# Patient Record
Sex: MALE | Race: WHITE
[De-identification: names, ages, dates, MRNs, and addresses within clinical notes are randomized per-mention and may not be internally consistent; named-entity substitution may affect disease eponyms.]

---

## 2017-07-18 ENCOUNTER — HOSPITAL ENCOUNTER (OUTPATIENT)
Dept: HOSPITAL 53 - M OPP | Age: 72
End: 2017-07-18
Attending: SURGERY
Payer: MEDICARE

## 2017-07-18 VITALS — HEIGHT: 72 IN | WEIGHT: 195 LBS | BODY MASS INDEX: 26.41 KG/M2

## 2017-07-18 VITALS — DIASTOLIC BLOOD PRESSURE: 62 MMHG | SYSTOLIC BLOOD PRESSURE: 137 MMHG

## 2017-07-18 DIAGNOSIS — Z12.11: Primary | ICD-10-CM

## 2017-07-18 DIAGNOSIS — C44.519: ICD-10-CM

## 2017-07-18 DIAGNOSIS — D12.6: ICD-10-CM

## 2017-07-18 DIAGNOSIS — Z79.899: ICD-10-CM

## 2017-07-18 DIAGNOSIS — Z86.010: ICD-10-CM

## 2017-07-18 DIAGNOSIS — R07.9: ICD-10-CM

## 2017-07-18 DIAGNOSIS — N40.0: ICD-10-CM

## 2017-07-18 DIAGNOSIS — K57.30: ICD-10-CM

## 2017-07-18 DIAGNOSIS — Z79.82: ICD-10-CM

## 2017-07-18 DIAGNOSIS — J45.909: ICD-10-CM

## 2017-07-18 NOTE — ROOR
________________________________________________________________________________

Patient Name: Jam Guidry             Procedure Date: 7/18/2017 12:02 PM

MRN: [id number]                          Account Number: [id number]

YOB: 1945              Age: 71

Room: [facility]                            Gender: Male

Note Status: Finalized                 

________________________________________________________________________________

 

Procedure:           Colonoscopy

Indications:         High risk colon cancer surveillance: Personal history of 

                     non-advanced adenoma, Last colonoscopy: November 2011

Providers:           John Cardona MD

Referring MD:        Juancarlos Trivedi MD

Requesting Provider: 

Medicines:           Monitored Anesthesia Care

Complications:       No immediate complications.

________________________________________________________________________________

Procedure:           Pre-Anesthesia Assessment:

                     - Prior to the procedure, a History and Physical was 

                     performed, and patient medications and allergies were 

                     reviewed. The patient is competent. The risks and 

                     benefits of the procedure and the sedation options and 

                     risks were discussed with the patient. All questions were 

                     answered and informed consent was obtained. Patient 

                     identification and proposed procedure were verified by 

                     the physician, the nurse and the anesthesiologist in the 

                     procedure room. Mental Status Examination: alert and 

                     oriented. Airway Examination: normal oropharyngeal airway 

                     and neck mobility. CV Examination: regular rate and 

                     rhythm. Prophylactic Antibiotics: The patient does not 

                     require prophylactic antibiotics. Prior Anticoagulants: 

                     The patient has taken no previous anticoagulant or 

                     antiplatelet agents. ASA Grade Assessment: II - A patient 

                     with mild systemic disease. After reviewing the risks and 

                     benefits, the patient was deemed in satisfactory 

                     condition to undergo the procedure. The anesthesia plan 

                     was to use monitored anesthesia care (MAC). Immediately 

                     prior to administration of medications, the patient was 

                     re-assessed for adequacy to receive sedatives. The heart 

                     rate, respiratory rate, oxygen saturations, blood 

                     pressure, adequacy of pulmonary ventilation, and response 

                     to care were monitored throughout the procedure. The 

                     physical status of the patient was re-assessed after the 

                     procedure.

                     The was introduced through the anus and advanced to the 

                     ileocecal valve. The colonoscopy was somewhat difficult 

                     due to poor bowel prep. Successful completion of the 

                     procedure was aided by lavage. The patient tolerated the 

                     procedure well.

                                                                                

Findings:

     The perianal and digital rectal examinations were normal.

     Multiple medium-mouthed diverticula were found in the sigmoid colon and 

     descending colon.

     A 3 mm polyp was found at 60 cm proximal to the anus. The polyp was 

     sessile. The polyp was removed with a jumbo cold forceps. Resection and 

     retrieval were complete. Estimated blood loss was minimal.

                                                                                

Impression:          - Diverticulosis in the sigmoid colon and in the 

                     descending colon.

                     - One 3 mm polyp at 60 cm proximal to the anus, removed 

                     with a jumbo cold forceps. Resected and retrieved.

Recommendation:      - Discharge patient to home.

                     - Resume regular diet.

                     - Continue present medications.

                     - Await pathology results.

                     - Telephone endoscopist for pathology results in 10 days.

                                                                                

 

__________________

John Cardona MD

7/18/2017 12:56:10 PM

Number of Addenda: 0

 

Note Initiated On: 7/18/2017 12:02 PM

Estimated Blood Loss:

     Estimated blood loss was minimal.

## 2017-08-14 ENCOUNTER — HOSPITAL ENCOUNTER (OUTPATIENT)
Dept: HOSPITAL 53 - M SFHCPLAZ | Age: 72
End: 2017-08-14
Attending: INTERNAL MEDICINE
Payer: MEDICARE

## 2017-08-14 DIAGNOSIS — F41.9: ICD-10-CM

## 2017-08-14 DIAGNOSIS — Z86.010: Primary | ICD-10-CM

## 2017-08-14 DIAGNOSIS — Z79.899: ICD-10-CM

## 2017-08-14 DIAGNOSIS — R97.20: ICD-10-CM

## 2017-08-14 DIAGNOSIS — E78.00: ICD-10-CM

## 2017-08-14 LAB
ALBUMIN SERPL BCG-MCNC: 3.9 GM/DL (ref 3.2–5.2)
ALBUMIN/GLOB SERPL: 1.3 {RATIO} (ref 1–1.93)
ALP SERPL-CCNC: 68 U/L (ref 45–117)
ALT SERPL W P-5'-P-CCNC: 14 U/L (ref 12–78)
ANION GAP SERPL CALC-SCNC: 7 MEQ/L (ref 8–16)
AST SERPL-CCNC: 15 U/L (ref 15–37)
BILIRUB SERPL-MCNC: 0.5 MG/DL (ref 0.2–1)
BUN SERPL-MCNC: 23 MG/DL (ref 7–18)
CALCIUM SERPL-MCNC: 8.4 MG/DL (ref 8.8–10.2)
CHLORIDE SERPL-SCNC: 107 MEQ/L (ref 98–107)
CHOLEST SERPL-MCNC: 210 MG/DL (ref ?–200)
CO2 SERPL-SCNC: 29 MEQ/L (ref 21–32)
CREAT SERPL-MCNC: 1.01 MG/DL (ref 0.7–1.3)
ERYTHROCYTE [DISTWIDTH] IN BLOOD BY AUTOMATED COUNT: 12.6 % (ref 11.5–14.5)
GFR SERPL CREATININE-BSD FRML MDRD: > 60 ML/MIN/{1.73_M2} (ref 42–?)
GLUCOSE SERPL-MCNC: 72 MG/DL (ref 83–110)
MCH RBC QN AUTO: 31.8 PG (ref 27–33)
MCHC RBC AUTO-ENTMCNC: 32.9 G/DL (ref 32–36.5)
MCV RBC AUTO: 96.4 FL (ref 80–96)
PLATELET # BLD AUTO: 223 K/MM3 (ref 150–450)
POTASSIUM SERPL-SCNC: 4.2 MEQ/L (ref 3.5–5.1)
PROT SERPL-MCNC: 6.9 GM/DL (ref 6.4–8.2)
SODIUM SERPL-SCNC: 143 MEQ/L (ref 136–145)
TRIGL SERPL-MCNC: 122 MG/DL (ref ?–150)
WBC # BLD AUTO: 4.6 K/MM3 (ref 4–10)

## 2018-08-02 ENCOUNTER — HOSPITAL ENCOUNTER (OUTPATIENT)
Dept: HOSPITAL 53 - M SFHCPLAZ | Age: 73
End: 2018-08-02
Attending: INTERNAL MEDICINE
Payer: MEDICARE

## 2018-08-02 DIAGNOSIS — E78.00: ICD-10-CM

## 2018-08-02 DIAGNOSIS — Z86.010: Primary | ICD-10-CM

## 2018-08-02 LAB
ALBUMIN/GLOBULIN RATIO: 1.16 (ref 1–1.93)
ALBUMIN: 3.7 GM/DL (ref 3.2–5.2)
ALKALINE PHOSPHATASE: 72 U/L (ref 45–117)
ALT SERPL W P-5'-P-CCNC: 16 U/L (ref 12–78)
ANION GAP: 6 MEQ/L (ref 8–16)
AST SERPL-CCNC: 14 U/L (ref 7–37)
BILIRUBIN,TOTAL: 0.5 MG/DL (ref 0.2–1)
BLOOD UREA NITROGEN: 19 MG/DL (ref 7–18)
CALCIUM LEVEL: 8.6 MG/DL (ref 8.8–10.2)
CARBON DIOXIDE LEVEL: 31 MEQ/L (ref 21–32)
CHLORIDE LEVEL: 106 MEQ/L (ref 98–107)
CHOLEST SERPL-MCNC: 211 MG/DL (ref ?–200)
CHOLESTEROL RISK RATIO: 4.22 (ref ?–5)
CREATININE FOR GFR: 1.01 MG/DL (ref 0.7–1.3)
GFR SERPL CREATININE-BSD FRML MDRD: > 60 ML/MIN/{1.73_M2} (ref 42–?)
GLUCOSE, FASTING: 76 MG/DL (ref 70–100)
HDLC SERPL-MCNC: 50 MG/DL (ref 40–?)
HEMATOCRIT: 43.4 % (ref 42–52)
HEMOGLOBIN: 14.5 G/DL (ref 13.5–17.5)
LDL CHOLESTEROL: 136.8 MG/DL (ref ?–100)
MEAN CORPUSCULAR HEMOGLOBIN: 30.8 PG (ref 27–33)
MEAN CORPUSCULAR HGB CONC: 33.4 G/DL (ref 32–36.5)
MEAN CORPUSCULAR VOLUME: 92.1 FL (ref 80–96)
NONHDLC SERPL-MCNC: 161 MG/DL
NRBC BLD AUTO-RTO: 0 % (ref 0–0)
PLATELET COUNT, AUTOMATED: 218 10^3/UL (ref 150–450)
POTASSIUM SERUM: 4.5 MEQ/L (ref 3.5–5.1)
RED BLOOD COUNT: 4.71 10^6/UL (ref 4.3–6.1)
RED CELL DISTRIBUTION WIDTH: 12.2 % (ref 11.5–14.5)
SODIUM LEVEL: 143 MEQ/L (ref 136–145)
TOTAL PROTEIN: 6.9 GM/DL (ref 6.4–8.2)
TRIGLYCERIDES LEVEL: 121 MG/DL (ref ?–150)
WHITE BLOOD COUNT: 4.6 10^3/UL (ref 4–10)

## 2018-08-02 PROCEDURE — 80053 COMPREHEN METABOLIC PANEL: CPT

## 2018-10-03 ENCOUNTER — HOSPITAL ENCOUNTER (OUTPATIENT)
Dept: HOSPITAL 53 - M SMT | Age: 73
End: 2018-10-03
Attending: NURSE PRACTITIONER
Payer: MEDICARE

## 2018-10-03 DIAGNOSIS — N40.1: Primary | ICD-10-CM

## 2018-10-03 LAB — PSA SERPL-MCNC: 2.03 NG/ML (ref ?–4)

## 2018-10-03 PROCEDURE — 84153 ASSAY OF PSA TOTAL: CPT

## 2018-10-05 ENCOUNTER — HOSPITAL ENCOUNTER (OUTPATIENT)
Dept: HOSPITAL 53 - M RAD | Age: 73
End: 2018-10-05
Attending: NURSE PRACTITIONER
Payer: MEDICARE

## 2018-10-05 DIAGNOSIS — R39.198: ICD-10-CM

## 2018-10-05 DIAGNOSIS — N40.1: Primary | ICD-10-CM

## 2018-10-05 PROCEDURE — 76857 US EXAM PELVIC LIMITED: CPT

## 2019-08-08 ENCOUNTER — HOSPITAL ENCOUNTER (OUTPATIENT)
Dept: HOSPITAL 53 - M SFHCPLAZ | Age: 74
End: 2019-08-08
Attending: INTERNAL MEDICINE
Payer: MEDICARE

## 2019-08-08 DIAGNOSIS — E78.00: Primary | ICD-10-CM

## 2019-08-08 LAB
ALBUMIN SERPL BCG-MCNC: 3.6 GM/DL (ref 3.2–5.2)
ALT SERPL W P-5'-P-CCNC: 14 U/L (ref 12–78)
BILIRUB SERPL-MCNC: 0.6 MG/DL (ref 0.2–1)
BUN SERPL-MCNC: 16 MG/DL (ref 7–18)
CALCIUM SERPL-MCNC: 8.8 MG/DL (ref 8.8–10.2)
CHLORIDE SERPL-SCNC: 107 MEQ/L (ref 98–107)
CHOLEST SERPL-MCNC: 215 MG/DL (ref ?–200)
CHOLEST/HDLC SERPL: 4.67 {RATIO} (ref ?–5)
CO2 SERPL-SCNC: 33 MEQ/L (ref 21–32)
CREAT SERPL-MCNC: 1.05 MG/DL (ref 0.7–1.3)
GFR SERPL CREATININE-BSD FRML MDRD: > 60 ML/MIN/{1.73_M2} (ref 42–?)
GLUCOSE SERPL-MCNC: 78 MG/DL (ref 70–100)
HDLC SERPL-MCNC: 46 MG/DL (ref 40–?)
LDLC SERPL CALC-MCNC: 134 MG/DL (ref ?–100)
NONHDLC SERPL-MCNC: 169 MG/DL
POTASSIUM SERPL-SCNC: 4.4 MEQ/L (ref 3.5–5.1)
PROT SERPL-MCNC: 7 GM/DL (ref 6.4–8.2)
SODIUM SERPL-SCNC: 142 MEQ/L (ref 136–145)
TRIGL SERPL-MCNC: 175 MG/DL (ref ?–150)

## 2020-05-22 ENCOUNTER — HOSPITAL ENCOUNTER (OUTPATIENT)
Dept: HOSPITAL 53 - M PLAIMG | Age: 75
End: 2020-05-22
Attending: NURSE PRACTITIONER
Payer: MEDICARE

## 2020-05-22 DIAGNOSIS — M51.37: ICD-10-CM

## 2020-05-22 DIAGNOSIS — M54.5: ICD-10-CM

## 2020-05-22 DIAGNOSIS — M51.36: Primary | ICD-10-CM

## 2020-05-22 PROCEDURE — 72110 X-RAY EXAM L-2 SPINE 4/>VWS: CPT

## 2020-05-22 NOTE — REPPI
LUMBAR SPINE SERIES:  Five views.

 

History:  Low back pain.

 

FINDINGS:  Lumbar vertebral body heights are preserved.  Alignment is normal.

There is discogenic spurring and disc space narrowing L4-5.  Mild anterior

spurring is seen at L3-4.  There is no evidence of spondylolysis or

spondylolisthesis.  There is mild facet hypertrophy and sclerosis bilaterally at

L5-S1.  Sacrum and SI joints are intact.  Psoas margins are symmetric.  No bony

destructive lesion is seen.

 

IMPRESSION:

 

Degenerative spondylosis changes most pronounced at L4-5 and L5-S1.  No acute

abnormality.

 

 

Electronically Signed by

Syd Dee MD 05/22/2020 04:14 P

## 2020-06-17 ENCOUNTER — HOSPITAL ENCOUNTER (OUTPATIENT)
Dept: HOSPITAL 53 - M LAB REF | Age: 75
End: 2020-06-17
Attending: DERMATOLOGY
Payer: MEDICARE

## 2020-06-17 DIAGNOSIS — C44.310: ICD-10-CM

## 2020-06-17 DIAGNOSIS — C44.41: Primary | ICD-10-CM

## 2020-06-24 ENCOUNTER — HOSPITAL ENCOUNTER (OUTPATIENT)
Dept: HOSPITAL 53 - M LAB REF | Age: 75
End: 2020-06-24
Attending: DERMATOLOGY
Payer: MEDICARE

## 2020-06-24 DIAGNOSIS — C44.310: Primary | ICD-10-CM

## 2020-07-10 ENCOUNTER — HOSPITAL ENCOUNTER (OUTPATIENT)
Dept: HOSPITAL 53 - M LAB REF | Age: 75
End: 2020-07-10
Attending: DERMATOLOGY
Payer: MEDICARE

## 2020-07-10 DIAGNOSIS — L90.5: Primary | ICD-10-CM

## 2020-07-10 DIAGNOSIS — L57.8: ICD-10-CM

## 2020-08-14 ENCOUNTER — HOSPITAL ENCOUNTER (OUTPATIENT)
Dept: HOSPITAL 53 - M SFHCPLAZ | Age: 75
End: 2020-08-14
Attending: INTERNAL MEDICINE
Payer: MEDICARE

## 2020-08-14 DIAGNOSIS — N13.9: ICD-10-CM

## 2020-08-14 DIAGNOSIS — F41.9: ICD-10-CM

## 2020-08-14 DIAGNOSIS — Z00.00: Primary | ICD-10-CM

## 2020-08-14 DIAGNOSIS — E78.00: ICD-10-CM

## 2020-08-14 DIAGNOSIS — K21.9: ICD-10-CM

## 2020-10-01 LAB
BASOPHILS # BLD AUTO: 0 10^3/UL (ref 0–0.2)
BASOPHILS NFR BLD AUTO: 0.7 % (ref 0–1)
EOSINOPHIL # BLD AUTO: 0.1 10^3/UL (ref 0–0.5)
EOSINOPHIL NFR BLD AUTO: 3.1 % (ref 0–3)
HCT VFR BLD AUTO: 45.9 % (ref 42–52)
HGB BLD-MCNC: 15.1 G/DL (ref 13.5–17.5)
LYMPHOCYTES # BLD AUTO: 1.2 10^3/UL (ref 1.5–5)
LYMPHOCYTES NFR BLD AUTO: 28.6 % (ref 24–44)
MCH RBC QN AUTO: 30.9 PG (ref 27–33)
MCHC RBC AUTO-ENTMCNC: 32.9 G/DL (ref 32–36.5)
MCV RBC AUTO: 93.9 FL (ref 80–96)
MONOCYTES # BLD AUTO: 0.5 10^3/UL (ref 0–0.8)
MONOCYTES NFR BLD AUTO: 11.6 % (ref 0–5)
NEUTROPHILS # BLD AUTO: 2.3 10^3/UL (ref 1.5–8.5)
NEUTROPHILS NFR BLD AUTO: 55.8 % (ref 36–66)
PLATELET # BLD AUTO: 235 10^3/UL (ref 150–450)
RBC # BLD AUTO: 4.89 10^6/UL (ref 4.3–6.1)
WBC # BLD AUTO: 4.1 10^3/UL (ref 4–10)

## 2020-10-07 LAB
ALBUMIN SERPL BCG-MCNC: 3.8 GM/DL (ref 3.2–5.2)
ALT SERPL W P-5'-P-CCNC: 15 U/L (ref 12–78)
BILIRUB SERPL-MCNC: 0.5 MG/DL (ref 0.2–1)
BUN SERPL-MCNC: 21 MG/DL (ref 7–18)
CALCIUM SERPL-MCNC: 9.2 MG/DL (ref 8.8–10.2)
CHLORIDE SERPL-SCNC: 106 MEQ/L (ref 98–107)
CHOLEST SERPL-MCNC: 219 MG/DL (ref ?–200)
CHOLEST/HDLC SERPL: 4.87 {RATIO} (ref ?–5)
CO2 SERPL-SCNC: 34 MEQ/L (ref 21–32)
CREAT SERPL-MCNC: 1.09 MG/DL (ref 0.7–1.3)
GFR SERPL CREATININE-BSD FRML MDRD: > 60 ML/MIN/{1.73_M2} (ref 42–?)
GLUCOSE SERPL-MCNC: 84 MG/DL (ref 70–100)
HDLC SERPL-MCNC: 45 MG/DL (ref 40–?)
LDLC SERPL CALC-MCNC: 142 MG/DL (ref ?–100)
NONHDLC SERPL-MCNC: 174 MG/DL
POTASSIUM SERPL-SCNC: 4.3 MEQ/L (ref 3.5–5.1)
PROT SERPL-MCNC: 7.2 GM/DL (ref 6.4–8.2)
SODIUM SERPL-SCNC: 140 MEQ/L (ref 136–145)
TRIGL SERPL-MCNC: 161 MG/DL (ref ?–150)

## 2020-11-17 ENCOUNTER — HOSPITAL ENCOUNTER (OUTPATIENT)
Dept: HOSPITAL 53 - M LABSMTC | Age: 75
End: 2020-11-17
Attending: PEDIATRICS
Payer: SELF-PAY

## 2020-11-17 DIAGNOSIS — Z20.828: Primary | ICD-10-CM

## 2021-01-05 ENCOUNTER — HOSPITAL ENCOUNTER (OUTPATIENT)
Dept: HOSPITAL 53 - M LABSMTC | Age: 76
End: 2021-01-05
Attending: PEDIATRICS
Payer: SELF-PAY

## 2021-01-05 DIAGNOSIS — Z20.822: Primary | ICD-10-CM

## 2021-01-06 ENCOUNTER — HOSPITAL ENCOUNTER (OUTPATIENT)
Dept: HOSPITAL 53 - M SFHCPLAZ | Age: 76
End: 2021-01-06
Attending: INTERNAL MEDICINE
Payer: MEDICARE

## 2021-01-06 DIAGNOSIS — N13.9: ICD-10-CM

## 2021-01-06 DIAGNOSIS — Z01.818: Primary | ICD-10-CM

## 2021-01-06 LAB
BASOPHILS # BLD AUTO: 0 10^3/UL (ref 0–0.2)
BASOPHILS NFR BLD AUTO: 0.7 % (ref 0–1)
BUN SERPL-MCNC: 21 MG/DL (ref 7–18)
CALCIUM SERPL-MCNC: 9.2 MG/DL (ref 8.8–10.2)
CHLORIDE SERPL-SCNC: 108 MEQ/L (ref 98–107)
CO2 SERPL-SCNC: 30 MEQ/L (ref 21–32)
CREAT SERPL-MCNC: 1.11 MG/DL (ref 0.7–1.3)
EOSINOPHIL # BLD AUTO: 0.2 10^3/UL (ref 0–0.5)
EOSINOPHIL NFR BLD AUTO: 2.9 % (ref 0–3)
GFR SERPL CREATININE-BSD FRML MDRD: > 60 ML/MIN/{1.73_M2} (ref 42–?)
GLUCOSE SERPL-MCNC: 93 MG/DL (ref 70–100)
HCT VFR BLD AUTO: 47.6 % (ref 42–52)
HGB BLD-MCNC: 15 G/DL (ref 13.5–17.5)
LYMPHOCYTES # BLD AUTO: 1.2 10^3/UL (ref 1.5–5)
LYMPHOCYTES NFR BLD AUTO: 21 % (ref 24–44)
MCH RBC QN AUTO: 30.1 PG (ref 27–33)
MCHC RBC AUTO-ENTMCNC: 31.5 G/DL (ref 32–36.5)
MCV RBC AUTO: 95.6 FL (ref 80–96)
MONOCYTES # BLD AUTO: 0.6 10^3/UL (ref 0–0.8)
MONOCYTES NFR BLD AUTO: 10.8 % (ref 0–5)
NEUTROPHILS # BLD AUTO: 3.6 10^3/UL (ref 1.5–8.5)
NEUTROPHILS NFR BLD AUTO: 64.4 % (ref 36–66)
PLATELET # BLD AUTO: 218 10^3/UL (ref 150–450)
POTASSIUM SERPL-SCNC: 4.4 MEQ/L (ref 3.5–5.1)
RBC # BLD AUTO: 4.98 10^6/UL (ref 4.3–6.1)
SODIUM SERPL-SCNC: 141 MEQ/L (ref 136–145)
WBC # BLD AUTO: 5.5 10^3/UL (ref 4–10)

## 2021-01-06 PROCEDURE — 87086 URINE CULTURE/COLONY COUNT: CPT

## 2021-01-06 PROCEDURE — 85025 COMPLETE CBC W/AUTO DIFF WBC: CPT

## 2021-01-06 PROCEDURE — 93005 ELECTROCARDIOGRAM TRACING: CPT

## 2021-01-06 PROCEDURE — 36415 COLL VENOUS BLD VENIPUNCTURE: CPT

## 2021-01-06 PROCEDURE — 80048 BASIC METABOLIC PNL TOTAL CA: CPT

## 2021-04-21 ENCOUNTER — HOSPITAL ENCOUNTER (EMERGENCY)
Dept: HOSPITAL 53 - M ED | Age: 76
Discharge: ANOTHER HOSPITAL | End: 2021-04-21
Payer: MEDICARE

## 2021-04-21 VITALS — HEIGHT: 71 IN | WEIGHT: 200.84 LBS | BODY MASS INDEX: 28.12 KG/M2

## 2021-04-21 VITALS — DIASTOLIC BLOOD PRESSURE: 78 MMHG | SYSTOLIC BLOOD PRESSURE: 141 MMHG

## 2021-04-21 DIAGNOSIS — Z79.82: ICD-10-CM

## 2021-04-21 DIAGNOSIS — Z79.899: ICD-10-CM

## 2021-04-21 DIAGNOSIS — Z87.891: ICD-10-CM

## 2021-04-21 DIAGNOSIS — I24.9: Primary | ICD-10-CM

## 2021-04-21 DIAGNOSIS — K21.9: ICD-10-CM

## 2021-04-21 DIAGNOSIS — E78.5: ICD-10-CM

## 2021-04-21 LAB
ALBUMIN SERPL BCG-MCNC: 3.5 GM/DL (ref 3.2–5.2)
ALT SERPL W P-5'-P-CCNC: 21 U/L (ref 12–78)
BASOPHILS # BLD AUTO: 0 10^3/UL (ref 0–0.2)
BASOPHILS NFR BLD AUTO: 0.7 % (ref 0–1)
BILIRUB CONJ SERPL-MCNC: < 0.1 MG/DL (ref 0–0.2)
BILIRUB SERPL-MCNC: 0.5 MG/DL (ref 0.2–1)
BUN SERPL-MCNC: 18 MG/DL (ref 7–18)
CALCIUM SERPL-MCNC: 9 MG/DL (ref 8.8–10.2)
CHLORIDE SERPL-SCNC: 106 MEQ/L (ref 98–107)
CO2 SERPL-SCNC: 30 MEQ/L (ref 21–32)
CREAT SERPL-MCNC: 0.93 MG/DL (ref 0.7–1.3)
EOSINOPHIL # BLD AUTO: 0.1 10^3/UL (ref 0–0.5)
EOSINOPHIL NFR BLD AUTO: 2.2 % (ref 0–3)
GFR SERPL CREATININE-BSD FRML MDRD: > 60 ML/MIN/{1.73_M2} (ref 42–?)
GLUCOSE SERPL-MCNC: 103 MG/DL (ref 70–100)
HCT VFR BLD AUTO: 46.7 % (ref 42–52)
HGB BLD-MCNC: 15.2 G/DL (ref 13.5–17.5)
LIPASE SERPL-CCNC: 75 U/L (ref 73–393)
LYMPHOCYTES # BLD AUTO: 1.1 10^3/UL (ref 1.5–5)
LYMPHOCYTES NFR BLD AUTO: 20.2 % (ref 24–44)
MCH RBC QN AUTO: 29.9 PG (ref 27–33)
MCHC RBC AUTO-ENTMCNC: 32.5 G/DL (ref 32–36.5)
MCV RBC AUTO: 91.9 FL (ref 80–96)
MONOCYTES # BLD AUTO: 0.5 10^3/UL (ref 0–0.8)
MONOCYTES NFR BLD AUTO: 9 % (ref 2–8)
NEUTROPHILS # BLD AUTO: 3.8 10^3/UL (ref 1.5–8.5)
NEUTROPHILS NFR BLD AUTO: 67.7 % (ref 36–66)
NT-PRO BNP: 97 PG/ML (ref ?–125)
PLATELET # BLD AUTO: 218 10^3/UL (ref 150–450)
POTASSIUM SERPL-SCNC: 5.1 MEQ/L (ref 3.5–5.1)
PROT SERPL-MCNC: 7.2 GM/DL (ref 6.4–8.2)
RBC # BLD AUTO: 5.08 10^6/UL (ref 4.3–6.1)
RSV RNA NPH QL NAA+PROBE: NEGATIVE
SODIUM SERPL-SCNC: 139 MEQ/L (ref 136–145)
WBC # BLD AUTO: 5.6 10^3/UL (ref 4–10)

## 2021-04-21 PROCEDURE — 80048 BASIC METABOLIC PNL TOTAL CA: CPT

## 2021-04-21 PROCEDURE — 94760 N-INVAS EAR/PLS OXIMETRY 1: CPT

## 2021-04-21 PROCEDURE — 93005 ELECTROCARDIOGRAM TRACING: CPT

## 2021-04-21 PROCEDURE — 99285 EMERGENCY DEPT VISIT HI MDM: CPT

## 2021-04-21 PROCEDURE — 93041 RHYTHM ECG TRACING: CPT

## 2021-04-21 PROCEDURE — 96374 THER/PROPH/DIAG INJ IV PUSH: CPT

## 2021-04-21 PROCEDURE — 71045 X-RAY EXAM CHEST 1 VIEW: CPT

## 2021-04-21 PROCEDURE — 87631 RESP VIRUS 3-5 TARGETS: CPT

## 2021-04-21 PROCEDURE — 83690 ASSAY OF LIPASE: CPT

## 2021-04-21 PROCEDURE — 84484 ASSAY OF TROPONIN QUANT: CPT

## 2021-04-21 PROCEDURE — 83880 ASSAY OF NATRIURETIC PEPTIDE: CPT

## 2021-04-21 PROCEDURE — 85025 COMPLETE CBC W/AUTO DIFF WBC: CPT

## 2021-04-21 PROCEDURE — 80076 HEPATIC FUNCTION PANEL: CPT

## 2021-04-21 PROCEDURE — 36415 COLL VENOUS BLD VENIPUNCTURE: CPT

## 2021-04-21 NOTE — REP
INDICATION:

CHEST PAIN.



COMPARISON:

Comparison chest x-ray 30 November 2011.



TECHNIQUE:

Portable upright AP chest radiograph.



FINDINGS:

The lungs are well inflated and free of infiltrate.  Pleural angles are sharp.  Heart

size is normal. Pulmonary vasculature is not increased.  Monitoring electrodes are

seen.



IMPRESSION:

No active disease.





<Electronically signed by Erik Dee > 04/21/21 1841

## 2021-04-22 NOTE — ECGEPIP
[facility] - ED

                                       

                                       Test Date:    2021

Pat Name:     JOSE SANABRIA             Department:   

Patient ID:   [id number]                 Room:         -

Gender:       Male                     Technician:   laura

:          1945               Requested By: Eleni Pope 

Order Number: [id number]     Reading MD:   Eleni Pope

                                 Measurements

Intervals                              Axis          

Rate:         64                       P:            41

NV:           180                      QRS:          1

QRSD:         84                       T:            29

QT:           392                                    

QTc:          404                                    

                           Interpretive Statements

Normal sinus rhythm

No prior

Electronically Signed on 2021 12:41:10 EDT by Eleni Pope

## 2021-05-17 ENCOUNTER — HOSPITAL ENCOUNTER (OUTPATIENT)
Dept: HOSPITAL 53 - M RAD | Age: 76
End: 2021-05-17
Attending: INTERNAL MEDICINE
Payer: MEDICARE

## 2021-05-17 DIAGNOSIS — R07.9: Primary | ICD-10-CM

## 2021-05-17 NOTE — REP
INDICATION:

HISTORY OF CHEST PAIN.



FINDINGS:

Multiple ultrasonographic images of the liver show the hepatic parenchymal echo

texture to appear unremarkable.  There are no focal masses.  There are 2 incidental

hepatic cysts.  The largest measures 2.8 cm.  There is no intrahepatic ductal

dilatation.  The common bile duct measures between 3.3 mm in its greatest transverse

dimension. Multiple ultrasonographic images of the gallbladder show multiple echogenic

foci within the gallbladder lumen which casts acoustic shadows.  There is no

gallbladder wall thickening or pericholecystic edema. Images of the pancreatic region

show no gross abnormality.



The imaged portion of the right kidney is unremarkable.



IMPRESSION:

Cholelithiasis and incidental hepatic cysts.



Accredited by the [employer] in General Ultrasound.





<Electronically signed by Norris Dietrich > 05/17/21 [id number]

## 2021-06-22 ENCOUNTER — HOSPITAL ENCOUNTER (EMERGENCY)
Dept: HOSPITAL 53 - M ED | Age: 76
Discharge: HOME | End: 2021-06-22
Payer: MEDICARE

## 2021-06-22 VITALS — WEIGHT: 182.39 LBS | BODY MASS INDEX: 24.7 KG/M2 | HEIGHT: 72 IN

## 2021-06-22 VITALS — SYSTOLIC BLOOD PRESSURE: 138 MMHG | DIASTOLIC BLOOD PRESSURE: 60 MMHG

## 2021-06-22 DIAGNOSIS — I25.10: ICD-10-CM

## 2021-06-22 DIAGNOSIS — F41.9: ICD-10-CM

## 2021-06-22 DIAGNOSIS — E78.5: ICD-10-CM

## 2021-06-22 DIAGNOSIS — S46.812A: Primary | ICD-10-CM
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
## 2021-06-22 DIAGNOSIS — Z95.1: ICD-10-CM

## 2021-06-22 DIAGNOSIS — R94.31: ICD-10-CM

## 2021-06-22 DIAGNOSIS — J90: ICD-10-CM
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
## 2021-06-22 DIAGNOSIS — K21.9: ICD-10-CM
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
## 2021-06-22 DIAGNOSIS — Z79.899: ICD-10-CM
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
## 2021-06-22 DIAGNOSIS — Z79.82: ICD-10-CM

## 2021-06-22 DIAGNOSIS — Z87.891: ICD-10-CM

## 2021-06-22 LAB
BASOPHILS # BLD AUTO: 0.1 10^3/UL (ref 0–0.2)
BASOPHILS NFR BLD AUTO: 0.5 % (ref 0–1)
BUN SERPL-MCNC: 18 MG/DL (ref 7–18)
CALCIUM SERPL-MCNC: 9.2 MG/DL (ref 8.8–10.2)
CHLORIDE SERPL-SCNC: 103 MEQ/L (ref 98–107)
CK MB CFR.DF SERPL CALC: 4.04
CK MB SERPL-MCNC: 1.9 NG/ML (ref ?–3.6)
CK SERPL-CCNC: 47 U/L (ref 39–308)
CO2 SERPL-SCNC: 27 MEQ/L (ref 21–32)
CREAT SERPL-MCNC: 1.03 MG/DL (ref 0.7–1.3)
EOSINOPHIL # BLD AUTO: 0.5 10^3/UL (ref 0–0.5)
EOSINOPHIL NFR BLD AUTO: 3.9 % (ref 0–3)
GFR SERPL CREATININE-BSD FRML MDRD: > 60 ML/MIN/{1.73_M2} (ref 42–?)
GLUCOSE SERPL-MCNC: 146 MG/DL (ref 70–100)
HCT VFR BLD AUTO: 40.1 % (ref 42–52)
HGB BLD-MCNC: 12.8 G/DL (ref 13.5–17.5)
LYMPHOCYTES # BLD AUTO: 1.2 10^3/UL (ref 1.5–5)
LYMPHOCYTES NFR BLD AUTO: 9.1 % (ref 24–44)
MCH RBC QN AUTO: 29.8 PG (ref 27–33)
MCHC RBC AUTO-ENTMCNC: 31.9 G/DL (ref 32–36.5)
MCV RBC AUTO: 93.3 FL (ref 80–96)
MONOCYTES # BLD AUTO: 0.9 10^3/UL (ref 0–0.8)
MONOCYTES NFR BLD AUTO: 7.2 % (ref 2–8)
NEUTROPHILS # BLD AUTO: 10.2 10^3/UL (ref 1.5–8.5)
NEUTROPHILS NFR BLD AUTO: 78.3 % (ref 36–66)
PLATELET # BLD AUTO: 769 10^3/UL (ref 150–450)
POTASSIUM SERPL-SCNC: 4.6 MEQ/L (ref 3.5–5.1)
RBC # BLD AUTO: 4.3 10^6/UL (ref 4.3–6.1)
SODIUM SERPL-SCNC: 137 MEQ/L (ref 136–145)
TROPONIN I SERPL-MCNC: 0.13 NG/ML (ref ?–0.1)
WBC # BLD AUTO: 13.1 10^3/UL (ref 4–10)

## 2021-06-22 NOTE — REP
INDICATION:

CHEST PAIN.



COMPARISON:

04/21/2021.



TECHNIQUE:

Single portable AP view of the chest was performed.



FINDINGS:

There is an acute moderate left pleural effusion.  There is mild left base

atelectasis/infiltrate.  There are mild atelectatic changes in the right lung base.

Multiple sternal wires are present.  Heart size is not well evaluated due to

obscuration of the left heart border.



IMPRESSION:

Moderate left pleural effusion.  Left base atelectasis/infiltrate.  Mild atelectatic

changes right lung base.





<Electronically signed by Cory Huyhn > 06/22/21 1948

## 2021-06-23 ENCOUNTER — HOSPITAL ENCOUNTER (OUTPATIENT)
Dept: HOSPITAL 53 - M RAD | Age: 76
End: 2021-06-23
Attending: THORACIC SURGERY (CARDIOTHORACIC VASCULAR SURGERY)
Payer: MEDICARE

## 2021-06-23 DIAGNOSIS — R06.02: Primary | ICD-10-CM

## 2021-06-23 PROCEDURE — 71275 CT ANGIOGRAPHY CHEST: CPT

## 2021-06-23 NOTE — REP
INDICATION:

SOB RO PE



COMPARISON:

None.



TECHNIQUE:

CT angiography of the chest after the intravenous administration of 75 cc Isovue 370.

Attention pulmonary arteries.



FINDINGS:

There is good visualization of the pulmonary arterial vasculature, however, there is

motion artifact obscuring some of the detail..  No definite focal filling defects are

seen that would be considered consistent with acute pulmonary emboli.  The thoracic

aorta is within normal limits.  There is a small left pleural effusion.  There is a

slight pericardial effusion.  The imaged upper abdomen is within normal limits.  The

imaged osseous structures are within normal limits.



Evaluation of the lung fields shows significant respiratory motion artifact obscuring

the detail.  Curvilinear bibasilar opacities are noted.  There is evidence of

cylindrical bronchiectasis and biapical pleuroparenchymal scarring.



IMPRESSION:

1. There are exam limitations potentially obscuring small pulmonary emboli.

2. There is a small left pleural effusion and a slight pericardial effusion.

3. Lung field findings and limitations as described above.

4. There is a small liver cyst.

5. Other findings as described above.





<Electronically signed by Norris Dietrich > 06/23/21 1910

## 2021-06-24 NOTE — ECGEPIP
[facility] - ED

                                       

                                       Test Date:    2021

Pat Name:     JOSE SANABRIA             Department:   

Patient ID:   [id number]                 Room:         -

Gender:       Male                     Technician:   KYMBERLY

:          1945               Requested By: Bismark HAWKINS

Order Number: [id number]     Reading MD:   Bismark Ireland

                                 Measurements

Intervals                              Axis          

Rate:         106                      P:            28

WI:           158                      QRS:          10

QRSD:         94                       T:            28

QT:           320                                    

QTc:          425                                    

                           Interpretive Statements

Sinus tachycardia

Nonspecific ST and T wave abnormality, consider anterior ischemia

SIMILAR TO PRIOR ON SAME DATE

Electronically Signed on 2021 7:09:14 EDT by Bismark Ireland

## 2021-06-24 NOTE — ECGEPIP
[facility] - ED

                                       

                                       Test Date:    2021

Pat Name:     JOSE SANABRIA             Department:   

Patient ID:   [id number]                 Room:         -

Gender:       Male                     Technician:   KYMBERLY

:          1945               Requested By: Bismark HAWKINS

Order Number: [id number]     Reading MD:   Bismark Ireland

                                 Measurements

Intervals                              Axis          

Rate:         99                       P:            32

NH:           168                      QRS:          12

QRSD:         92                       T:            29

QT:           334                                    

QTc:          428                                    

                           Interpretive Statements

Normal sinus rhythm

Possible Left atrial enlargement

NSTTW ABNORMALITY(S) consider anterior ischemia

Electronically Signed on 2021 7:07:47 EDT by Bismark Ireland

## 2021-08-04 ENCOUNTER — HOSPITAL ENCOUNTER (OUTPATIENT)
Dept: HOSPITAL 53 - M PLALAB | Age: 76
End: 2021-08-04
Attending: INTERNAL MEDICINE
Payer: MEDICARE

## 2021-08-04 ENCOUNTER — HOSPITAL ENCOUNTER (OUTPATIENT)
Dept: HOSPITAL 53 - M SFHCPLAZ | Age: 76
End: 2021-08-04
Attending: INTERNAL MEDICINE
Payer: MEDICARE

## 2021-08-04 DIAGNOSIS — E78.00: Primary | ICD-10-CM

## 2021-08-04 DIAGNOSIS — Z86.010: ICD-10-CM

## 2021-08-04 DIAGNOSIS — Z11.59: ICD-10-CM

## 2021-08-04 LAB
ALBUMIN SERPL BCG-MCNC: 3.4 GM/DL (ref 3.2–5.2)
ALT SERPL W P-5'-P-CCNC: 17 U/L (ref 12–78)
BASOPHILS # BLD AUTO: 0.1 10^3/UL (ref 0–0.2)
BASOPHILS NFR BLD AUTO: 1.1 % (ref 0–1)
BILIRUB SERPL-MCNC: 0.4 MG/DL (ref 0.2–1)
BUN SERPL-MCNC: 18 MG/DL (ref 7–18)
CALCIUM SERPL-MCNC: 9.1 MG/DL (ref 8.8–10.2)
CHLORIDE SERPL-SCNC: 107 MEQ/L (ref 98–107)
CHOLEST SERPL-MCNC: 110 MG/DL (ref ?–200)
CHOLEST/HDLC SERPL: 2.68 {RATIO} (ref ?–5)
CO2 SERPL-SCNC: 32 MEQ/L (ref 21–32)
CREAT SERPL-MCNC: 0.95 MG/DL (ref 0.7–1.3)
EOSINOPHIL # BLD AUTO: 0.2 10^3/UL (ref 0–0.5)
EOSINOPHIL NFR BLD AUTO: 3.9 % (ref 0–3)
GFR SERPL CREATININE-BSD FRML MDRD: > 60 ML/MIN/{1.73_M2} (ref 42–?)
GLUCOSE SERPL-MCNC: 81 MG/DL (ref 70–100)
HCT VFR BLD AUTO: 43.7 % (ref 42–52)
HCV AB SER QL: 0.1 INDEX (ref ?–0.8)
HDLC SERPL-MCNC: 41 MG/DL (ref 40–?)
HGB BLD-MCNC: 13.6 G/DL (ref 13.5–17.5)
LDLC SERPL CALC-MCNC: 47 MG/DL (ref ?–100)
LYMPHOCYTES # BLD AUTO: 1 10^3/UL (ref 1.5–5)
LYMPHOCYTES NFR BLD AUTO: 22.3 % (ref 24–44)
MCH RBC QN AUTO: 30 PG (ref 27–33)
MCHC RBC AUTO-ENTMCNC: 31.1 G/DL (ref 32–36.5)
MCV RBC AUTO: 96.3 FL (ref 80–96)
MONOCYTES # BLD AUTO: 0.5 10^3/UL (ref 0–0.8)
MONOCYTES NFR BLD AUTO: 11.4 % (ref 2–8)
NEUTROPHILS # BLD AUTO: 2.8 10^3/UL (ref 1.5–8.5)
NEUTROPHILS NFR BLD AUTO: 60.9 % (ref 36–66)
NONHDLC SERPL-MCNC: 69 MG/DL
PLATELET # BLD AUTO: 261 10^3/UL (ref 150–450)
POTASSIUM SERPL-SCNC: 4.7 MEQ/L (ref 3.5–5.1)
PROT SERPL-MCNC: 6.8 GM/DL (ref 6.4–8.2)
RBC # BLD AUTO: 4.54 10^6/UL (ref 4.3–6.1)
SODIUM SERPL-SCNC: 141 MEQ/L (ref 136–145)
TRIGL SERPL-MCNC: 111 MG/DL (ref ?–150)
TSH SERPL DL<=0.005 MIU/L-ACNC: 1.24 UIU/ML (ref 0.36–3.74)
WBC # BLD AUTO: 4.6 10^3/UL (ref 4–10)

## 2021-08-04 PROCEDURE — 80053 COMPREHEN METABOLIC PANEL: CPT

## 2021-08-04 PROCEDURE — 86803 HEPATITIS C AB TEST: CPT

## 2021-08-04 PROCEDURE — 80061 LIPID PANEL: CPT

## 2021-08-04 PROCEDURE — 36415 COLL VENOUS BLD VENIPUNCTURE: CPT

## 2021-08-04 PROCEDURE — 85025 COMPLETE CBC W/AUTO DIFF WBC: CPT

## 2021-08-04 PROCEDURE — 84443 ASSAY THYROID STIM HORMONE: CPT

## 2022-04-28 ENCOUNTER — HOSPITAL ENCOUNTER (OUTPATIENT)
Dept: HOSPITAL 53 - M LABSMTC | Age: 77
End: 2022-04-28
Attending: ANESTHESIOLOGY
Payer: MEDICARE

## 2022-04-28 DIAGNOSIS — Z01.818: Primary | ICD-10-CM

## 2022-04-28 DIAGNOSIS — Z11.52: ICD-10-CM

## 2022-05-03 ENCOUNTER — HOSPITAL ENCOUNTER (OUTPATIENT)
Dept: HOSPITAL 53 - M OPP | Age: 77
Discharge: HOME | End: 2022-05-03
Attending: SURGERY
Payer: MEDICARE

## 2022-05-03 VITALS — BODY MASS INDEX: 25.84 KG/M2 | WEIGHT: 190.8 LBS | HEIGHT: 72 IN

## 2022-05-03 VITALS — DIASTOLIC BLOOD PRESSURE: 56 MMHG | SYSTOLIC BLOOD PRESSURE: 108 MMHG

## 2022-05-03 DIAGNOSIS — D12.0: ICD-10-CM

## 2022-05-03 DIAGNOSIS — Z12.11: Primary | ICD-10-CM

## 2022-05-03 DIAGNOSIS — Z79.899: ICD-10-CM

## 2022-05-03 DIAGNOSIS — Z86.010: ICD-10-CM

## 2022-05-03 DIAGNOSIS — K62.1: ICD-10-CM

## 2022-05-03 DIAGNOSIS — Z79.82: ICD-10-CM

## 2022-05-03 DIAGNOSIS — D12.2: ICD-10-CM

## 2022-05-03 DIAGNOSIS — Z95.1: ICD-10-CM

## 2022-05-03 DIAGNOSIS — K57.30: ICD-10-CM

## 2022-10-06 ENCOUNTER — HOSPITAL ENCOUNTER (OUTPATIENT)
Dept: HOSPITAL 53 - M SOG | Age: 77
End: 2022-10-06
Attending: ORTHOPAEDIC SURGERY
Payer: MEDICARE

## 2022-10-06 DIAGNOSIS — M79.641: ICD-10-CM

## 2022-10-06 DIAGNOSIS — M79.642: Primary | ICD-10-CM

## 2023-06-22 ENCOUNTER — HOSPITAL ENCOUNTER (OUTPATIENT)
Dept: HOSPITAL 53 - M PLALAB | Age: 78
End: 2023-06-22
Payer: MEDICARE

## 2023-06-22 DIAGNOSIS — I25.810: Primary | ICD-10-CM

## 2023-06-22 DIAGNOSIS — Z12.5: ICD-10-CM

## 2023-06-22 DIAGNOSIS — E07.9: ICD-10-CM

## 2023-06-22 LAB
25(OH)D3 SERPL-MCNC: 48.2 NG/ML (ref 20–100)
ALBUMIN SERPL BCG-MCNC: 3.8 G/DL (ref 3.2–5.2)
ALP SERPL-CCNC: 72 U/L (ref 46–116)
ALT SERPL W P-5'-P-CCNC: 15 U/L (ref 7–40)
AST SERPL-CCNC: 17 U/L (ref ?–34)
BILIRUB SERPL-MCNC: 0.5 MG/DL (ref 0.3–1.2)
BUN SERPL-MCNC: 25 MG/DL (ref 9–23)
CALCIUM SERPL-MCNC: 9.5 MG/DL (ref 8.3–10.6)
CHLORIDE SERPL-SCNC: 105 MMOL/L (ref 98–107)
CHOLEST SERPL-MCNC: 117 MG/DL (ref ?–200)
CHOLEST/HDLC SERPL: 3.01 {RATIO} (ref ?–5)
CO2 SERPL-SCNC: 30 MMOL/L (ref 20–31)
CREAT SERPL-MCNC: 1.15 MG/DL (ref 0.7–1.3)
CREAT UR-MCNC: 127.5 MG/DL
CRP SERPL-MCNC: < 0.4 MG/DL (ref ?–1)
EST. AVERAGE GLUCOSE BLD GHB EST-MCNC: 103 MG/DL (ref 60–110)
GFR SERPL CREATININE-BSD FRML MDRD: > 60 ML/MIN/{1.73_M2} (ref 42–?)
GLUCOSE SERPL-MCNC: 83 MG/DL (ref 74–106)
HCT VFR BLD AUTO: 48.8 % (ref 42–52)
HDLC SERPL-MCNC: 38.8 MG/DL (ref 40–?)
HGB BLD-MCNC: 16.1 G/DL (ref 13.5–17.5)
LDLC SERPL CALC-MCNC: 57.4 MG/DL (ref ?–100)
MCH RBC QN AUTO: 30.4 PG (ref 27–33)
MCHC RBC AUTO-ENTMCNC: 33 G/DL (ref 32–36.5)
MCV RBC AUTO: 92.2 FL (ref 80–96)
MICROALBUMIN UR-MCNC: 7 MG/L
MICROALBUMIN/CREAT UR: 5.4 MCG/MG (ref 0–30)
NONHDLC SERPL-MCNC: 78.2 MG/DL
PLATELET # BLD AUTO: 215 10^3/UL (ref 150–450)
POTASSIUM SERPL-SCNC: 4.5 MMOL/L (ref 3.5–5.1)
PROT SERPL-MCNC: 6.8 G/DL (ref 5.7–8.2)
RBC # BLD AUTO: 5.29 10^6/UL (ref 4.3–6.1)
SODIUM SERPL-SCNC: 139 MMOL/L (ref 136–145)
T4 FREE SERPL-MCNC: 0.8 NG/DL (ref 0.89–1.76)
TRIGL SERPL-MCNC: 104 MG/DL (ref ?–150)
TSH SERPL DL<=0.005 MIU/L-ACNC: 1.13 UIU/ML (ref 0.55–4.78)
VIT B12 SERPL-MCNC: 444 PG/ML (ref 211–911)
WBC # BLD AUTO: 4.8 10^3/UL (ref 4–10)

## 2023-06-22 PROCEDURE — 82306 VITAMIN D 25 HYDROXY: CPT

## 2023-06-22 PROCEDURE — 83525 ASSAY OF INSULIN: CPT

## 2023-06-22 PROCEDURE — 80053 COMPREHEN METABOLIC PANEL: CPT

## 2023-06-22 PROCEDURE — 84439 ASSAY OF FREE THYROXINE: CPT

## 2023-06-22 PROCEDURE — 80061 LIPID PANEL: CPT

## 2023-06-22 PROCEDURE — 83695 ASSAY OF LIPOPROTEIN(A): CPT

## 2023-06-22 PROCEDURE — 36415 COLL VENOUS BLD VENIPUNCTURE: CPT

## 2023-06-22 PROCEDURE — 83036 HEMOGLOBIN GLYCOSYLATED A1C: CPT

## 2023-06-22 PROCEDURE — 82607 VITAMIN B-12: CPT

## 2023-06-22 PROCEDURE — 84443 ASSAY THYROID STIM HORMONE: CPT

## 2023-06-22 PROCEDURE — 82043 UR ALBUMIN QUANTITATIVE: CPT

## 2023-06-22 PROCEDURE — 86140 C-REACTIVE PROTEIN: CPT

## 2023-06-22 PROCEDURE — 85027 COMPLETE CBC AUTOMATED: CPT

## 2023-06-23 LAB — INSULIN SERPL-ACNC: 17.7 UIU/ML (ref 2.6–24.9)

## 2023-09-01 ENCOUNTER — HOSPITAL ENCOUNTER (OUTPATIENT)
Dept: HOSPITAL 53 - M PLALAB | Age: 78
End: 2023-09-01
Payer: MEDICARE

## 2023-09-01 DIAGNOSIS — Z01.818: Primary | ICD-10-CM

## 2023-09-01 LAB
BUN SERPL-MCNC: 21 MG/DL (ref 9–23)
CALCIUM SERPL-MCNC: 9.2 MG/DL (ref 8.3–10.6)
CHLORIDE SERPL-SCNC: 106 MMOL/L (ref 98–107)
CO2 SERPL-SCNC: 29 MMOL/L (ref 20–31)
CREAT SERPL-MCNC: 0.98 MG/DL (ref 0.7–1.3)
GFR SERPL CREATININE-BSD FRML MDRD: > 60 ML/MIN/{1.73_M2} (ref 42–?)
GLUCOSE SERPL-MCNC: 89 MG/DL (ref 74–106)
HCT VFR BLD AUTO: 46.6 % (ref 42–52)
HGB BLD-MCNC: 15.1 G/DL (ref 13.5–17.5)
MCH RBC QN AUTO: 30.3 PG (ref 27–33)
MCHC RBC AUTO-ENTMCNC: 32.4 G/DL (ref 32–36.5)
MCV RBC AUTO: 93.4 FL (ref 80–96)
PLATELET # BLD AUTO: 212 10^3/UL (ref 150–450)
POTASSIUM SERPL-SCNC: 4.4 MMOL/L (ref 3.5–5.1)
RBC # BLD AUTO: 4.99 10^6/UL (ref 4.3–6.1)
SODIUM SERPL-SCNC: 143 MMOL/L (ref 136–145)
WBC # BLD AUTO: 4.5 10^3/UL (ref 4–10)

## 2024-03-28 ENCOUNTER — HOSPITAL ENCOUNTER (OUTPATIENT)
Dept: HOSPITAL 53 - M PLALAB | Age: 79
End: 2024-03-28
Attending: NURSE PRACTITIONER
Payer: MEDICARE

## 2024-03-28 DIAGNOSIS — I25.10: ICD-10-CM

## 2024-03-28 DIAGNOSIS — R53.83: Primary | ICD-10-CM

## 2024-03-28 LAB
BUN SERPL-MCNC: 23 MG/DL (ref 9–23)
CALCIUM SERPL-MCNC: 9.1 MG/DL (ref 8.3–10.6)
CHLORIDE SERPL-SCNC: 106 MMOL/L (ref 98–107)
CHOLEST SERPL-MCNC: 115 MG/DL (ref ?–200)
CHOLEST/HDLC SERPL: 3.02 {RATIO} (ref ?–5)
CO2 SERPL-SCNC: 34 MMOL/L (ref 20–31)
CREAT SERPL-MCNC: 1.09 MG/DL (ref 0.7–1.3)
GFR SERPL CREATININE-BSD FRML MDRD: > 60 ML/MIN/{1.73_M2} (ref 42–?)
GLUCOSE SERPL-MCNC: 90 MG/DL (ref 74–106)
HCT VFR BLD AUTO: 47.4 % (ref 42–52)
HDLC SERPL-MCNC: 38 MG/DL (ref 40–?)
HGB BLD-MCNC: 15.4 G/DL (ref 13.5–17.5)
LDLC SERPL CALC-MCNC: 56 MG/DL (ref ?–100)
MCH RBC QN AUTO: 30.6 PG (ref 27–33)
MCHC RBC AUTO-ENTMCNC: 32.5 G/DL (ref 32–36.5)
MCV RBC AUTO: 94 FL (ref 80–96)
NONHDLC SERPL-MCNC: 77 MG/DL
PLATELET # BLD AUTO: 210 10^3/UL (ref 150–450)
POTASSIUM SERPL-SCNC: 4.6 MMOL/L (ref 3.5–5.1)
RBC # BLD AUTO: 5.04 10^6/UL (ref 4.3–6.1)
SODIUM SERPL-SCNC: 143 MMOL/L (ref 136–145)
TRIGL SERPL-MCNC: 105 MG/DL (ref ?–150)
WBC # BLD AUTO: 4.8 10^3/UL (ref 4–10)

## 2024-07-15 ENCOUNTER — HOSPITAL ENCOUNTER (OUTPATIENT)
Dept: HOSPITAL 53 - M PLALAB | Age: 79
End: 2024-07-15
Payer: MEDICARE

## 2024-07-15 DIAGNOSIS — Z12.5: ICD-10-CM

## 2024-07-15 DIAGNOSIS — Z00.00: Primary | ICD-10-CM

## 2024-07-15 DIAGNOSIS — E78.00: ICD-10-CM

## 2024-07-15 DIAGNOSIS — E07.9: ICD-10-CM

## 2024-07-15 LAB
25(OH)D3 SERPL-MCNC: 49.7 NG/ML (ref 20–100)
ALBUMIN SERPL BCG-MCNC: 3.7 G/DL (ref 3.2–5.2)
ALP SERPL-CCNC: 65 U/L (ref 46–116)
ALT SERPL W P-5'-P-CCNC: 14 U/L (ref 7–40)
AST SERPL-CCNC: 17 U/L (ref ?–34)
BASOPHILS # BLD AUTO: 0 10^3/UL (ref 0–0.2)
BASOPHILS NFR BLD AUTO: 0.6 % (ref 0–1)
BILIRUB SERPL-MCNC: 0.6 MG/DL (ref 0.3–1.2)
BUN SERPL-MCNC: 23 MG/DL (ref 9–23)
CALCIUM SERPL-MCNC: 9 MG/DL (ref 8.3–10.6)
CHLORIDE SERPL-SCNC: 108 MMOL/L (ref 98–107)
CHOLEST SERPL-MCNC: 117 MG/DL (ref ?–200)
CHOLEST/HDLC SERPL: 2.94 {RATIO} (ref ?–5)
CO2 SERPL-SCNC: 28 MMOL/L (ref 20–31)
CREAT SERPL-MCNC: 1.11 MG/DL (ref 0.7–1.3)
CREAT UR-MCNC: 122.6 MG/DL
CRP SERPL-MCNC: < 0.4 MG/DL (ref ?–1)
EOSINOPHIL # BLD AUTO: 0.1 10^3/UL (ref 0–0.5)
EOSINOPHIL NFR BLD AUTO: 1.6 % (ref 0–3)
EST. AVERAGE GLUCOSE BLD GHB EST-MCNC: 103 MG/DL (ref 60–110)
GFR SERPL CREATININE-BSD FRML MDRD: > 60 ML/MIN/{1.73_M2} (ref 42–?)
GLUCOSE SERPL-MCNC: 73 MG/DL (ref 74–106)
HCT VFR BLD AUTO: 47.2 % (ref 42–52)
HDLC SERPL-MCNC: 39.7 MG/DL (ref 40–?)
HGB BLD-MCNC: 15.3 G/DL (ref 13.5–17.5)
LDLC SERPL CALC-MCNC: 54.5 MG/DL (ref ?–100)
LYMPHOCYTES # BLD AUTO: 1.1 10^3/UL (ref 1.5–5)
LYMPHOCYTES NFR BLD AUTO: 21.2 % (ref 24–44)
MCH RBC QN AUTO: 30.4 PG (ref 27–33)
MCHC RBC AUTO-ENTMCNC: 32.4 G/DL (ref 32–36.5)
MCV RBC AUTO: 93.7 FL (ref 80–96)
MICROALBUMIN UR-MCNC: 5 MG/L
MICROALBUMIN/CREAT UR: 4 MCG/MG (ref 0–30)
MONOCYTES # BLD AUTO: 0.7 10^3/UL (ref 0–0.8)
MONOCYTES NFR BLD AUTO: 13.2 % (ref 2–8)
NEUTROPHILS # BLD AUTO: 3.3 10^3/UL (ref 1.5–8.5)
NEUTROPHILS NFR BLD AUTO: 63.4 % (ref 36–66)
NONHDLC SERPL-MCNC: 77.3 MG/DL
PLATELET # BLD AUTO: 205 10^3/UL (ref 150–450)
POTASSIUM SERPL-SCNC: 4.5 MMOL/L (ref 3.5–5.1)
PROT SERPL-MCNC: 6.6 G/DL (ref 5.7–8.2)
PSA SERPL-MCNC: 3.4 NG/ML (ref ?–4)
RBC # BLD AUTO: 5.04 10^6/UL (ref 4.3–6.1)
SODIUM SERPL-SCNC: 142 MMOL/L (ref 136–145)
T4 FREE SERPL-MCNC: 0.93 NG/DL (ref 0.89–1.76)
TRIGL SERPL-MCNC: 114 MG/DL (ref ?–150)
TSH SERPL DL<=0.005 MIU/L-ACNC: 0.96 UIU/ML (ref 0.55–4.78)
VIT B12 SERPL-MCNC: 604 PG/ML (ref 211–911)
WBC # BLD AUTO: 5.2 10^3/UL (ref 4–10)

## 2024-07-15 PROCEDURE — 85025 COMPLETE CBC W/AUTO DIFF WBC: CPT

## 2024-07-15 PROCEDURE — 82306 VITAMIN D 25 HYDROXY: CPT

## 2024-07-15 PROCEDURE — 82043 UR ALBUMIN QUANTITATIVE: CPT

## 2024-07-15 PROCEDURE — 80061 LIPID PANEL: CPT

## 2024-07-15 PROCEDURE — 82607 VITAMIN B-12: CPT

## 2024-07-15 PROCEDURE — 84439 ASSAY OF FREE THYROXINE: CPT

## 2024-07-15 PROCEDURE — 86140 C-REACTIVE PROTEIN: CPT

## 2024-07-15 PROCEDURE — 80053 COMPREHEN METABOLIC PANEL: CPT

## 2024-07-15 PROCEDURE — 84443 ASSAY THYROID STIM HORMONE: CPT

## 2024-07-15 PROCEDURE — 36415 COLL VENOUS BLD VENIPUNCTURE: CPT

## 2024-07-15 PROCEDURE — 83036 HEMOGLOBIN GLYCOSYLATED A1C: CPT
